# Patient Record
Sex: FEMALE | Race: WHITE | Employment: FULL TIME | URBAN - METROPOLITAN AREA
[De-identification: names, ages, dates, MRNs, and addresses within clinical notes are randomized per-mention and may not be internally consistent; named-entity substitution may affect disease eponyms.]

---

## 2017-11-23 ENCOUNTER — APPOINTMENT (OUTPATIENT)
Dept: GENERAL RADIOLOGY | Age: 57
End: 2017-11-23
Attending: PHYSICIAN ASSISTANT
Payer: COMMERCIAL

## 2017-11-23 ENCOUNTER — HOSPITAL ENCOUNTER (EMERGENCY)
Age: 57
Discharge: HOME | End: 2017-11-23
Attending: EMERGENCY MEDICINE
Payer: COMMERCIAL

## 2017-11-23 VITALS
RESPIRATION RATE: 16 BRPM | DIASTOLIC BLOOD PRESSURE: 95 MMHG | SYSTOLIC BLOOD PRESSURE: 135 MMHG | WEIGHT: 224 LBS | BODY MASS INDEX: 36 KG/M2 | TEMPERATURE: 97.5 F | HEIGHT: 66 IN | OXYGEN SATURATION: 98 % | HEART RATE: 66 BPM

## 2017-11-23 DIAGNOSIS — S92.352A CLOSED DISPLACED FRACTURE OF FIFTH METATARSAL BONE OF LEFT FOOT, INITIAL ENCOUNTER: Primary | ICD-10-CM

## 2017-11-23 PROCEDURE — 99283 EMERGENCY DEPT VISIT LOW MDM: CPT

## 2017-11-23 PROCEDURE — 73630 X-RAY EXAM OF FOOT: CPT

## 2017-11-23 PROCEDURE — 77030036687 HC SHOE PSTOP S2SG -A

## 2017-11-23 RX ORDER — HYDROCODONE BITARTRATE AND ACETAMINOPHEN 5; 325 MG/1; MG/1
TABLET ORAL
Qty: 12 TAB | Refills: 0 | Status: SHIPPED | OUTPATIENT
Start: 2017-11-23

## 2017-11-23 RX ORDER — CITALOPRAM 40 MG/1
40 TABLET, FILM COATED ORAL DAILY
COMMUNITY

## 2017-11-23 RX ORDER — PROPRANOLOL HYDROCHLORIDE 80 MG/1
CAPSULE, EXTENDED RELEASE ORAL DAILY
COMMUNITY

## 2017-11-23 RX ORDER — PRAVASTATIN SODIUM 40 MG/1
40 TABLET ORAL
COMMUNITY

## 2017-11-23 NOTE — ED PROVIDER NOTES
Patient is a 61 y.o. female presenting with fall and foot injury. Fall     Foot Injury      Emeli Pires is a 61 y.o. female presents with L foot pain \"after I missed step last night and hit my foot\" Denies of any hx of injury to L foot or pain in L ankle, knee and hip. Denies of any R ankle pain, knee or hip pain. Noticed some swelilng on the L foot. Pain currently is a dull aching pain   Past Medical History:   Diagnosis Date    Fibromyalgia     High cholesterol     Psychiatric disorder     biplolar/anxiety       Past Surgical History:   Procedure Laterality Date    HX HEENT      t&a    HX HEENT      eye    HX ORTHOPAEDIC      bilateral bunion and hammer toe         History reviewed. No pertinent family history. Social History     Social History    Marital status: SINGLE     Spouse name: N/A    Number of children: N/A    Years of education: N/A     Occupational History    Not on file. Social History Main Topics    Smoking status: Never Smoker    Smokeless tobacco: Never Used    Alcohol use 8.4 oz/week     14 Glasses of wine per week    Drug use: Not on file    Sexual activity: Not on file     Other Topics Concern    Not on file     Social History Narrative    No narrative on file         ALLERGIES: Latex; Codeine; Nsaids (non-steroidal anti-inflammatory drug); and Sulfa (sulfonamide antibiotics)    Review of Systems   Constitutional: Negative. HENT: Negative. Eyes: Negative. Respiratory: Negative. Cardiovascular: Negative. Gastrointestinal: Negative. Endocrine: Negative. Genitourinary: Negative. Musculoskeletal: Negative. Skin: Negative. Allergic/Immunologic: Negative. Neurological: Negative. Hematological: Negative. Psychiatric/Behavioral: Negative.         Vitals:    11/23/17 1011   BP: (!) 135/95   Pulse: 66   Resp: 16   Temp: 97.5 °F (36.4 °C)   SpO2: 98%   Weight: 101.6 kg (224 lb)   Height: 5' 6\" (1.676 m)            Physical Exam Constitutional: She is oriented to person, place, and time. She appears well-developed and well-nourished. No distress. HENT:   Head: Normocephalic and atraumatic. Eyes: Conjunctivae and EOM are normal. Pupils are equal, round, and reactive to light. Neck: Normal range of motion. Cardiovascular: Normal rate, regular rhythm and normal heart sounds. Pulmonary/Chest: Effort normal and breath sounds normal. No respiratory distress. She has no wheezes. She has no rales. She exhibits no tenderness. Abdominal: Soft. Bowel sounds are normal. She exhibits no distension. There is no tenderness. There is no rebound and no guarding. Musculoskeletal:        Feet:    Neurological: She is alert and oriented to person, place, and time. Skin: Skin is warm. She is not diaphoretic. Psychiatric: She has a normal mood and affect. Her behavior is normal. Judgment and thought content normal.        MDM  Number of Diagnoses or Management Options  Closed displaced fracture of fifth metatarsal bone of left foot, initial encounter:   Diagnosis management comments: Updated patient on the diagnosis and advised nurse to provide copy for X ray on CD before discharge . Provided post op shoe and crutches and analgesic. Will discharge. ED Course       Procedures      DISCHARGE NOTE:  11:03 AM    Joan Cantrell  results have been reviewed with her. She has been counseled regarding her diagnosis, treatment, and plan. She verbally conveys understanding and agreement of the signs, symptoms, diagnosis, treatment and prognosis and additionally agrees to follow up as discussed. She also agrees with the care-plan and conveys that all of her questions have been answered. I have also provided discharge instructions for her that include: educational information regarding their diagnosis and treatment, and list of reasons why they would want to return to the ED prior to their follow-up appointment, should her condition change. CLINICAL IMPRESSION:    1. Closed displaced fracture of fifth metatarsal bone of left foot, initial encounter        AFTER VISIT PLAN:    Current Discharge Medication List      START taking these medications    Details   HYDROcodone-acetaminophen (NORCO) 5-325 mg per tablet Take one tablet every 6-8 hours as needed for pain. Qty: 12 Tab, Refills: 0              Follow-up Information     Follow up With Details Comments Contact Info    primary care provider    please follow up with your primary care provider    [facility]  If symptoms worsen [address] [facility]  [phone]    primary care provider       orthopedic    please follow up with your orthopedic provider in [place] as part of ER follow up .            Written by Paul Mcconnell PA-C

## 2017-11-23 NOTE — ED NOTES
I have reviewed discharge instructions with the patient. The patient verbalized understanding. Current Discharge Medication List      START taking these medications    Details   HYDROcodone-acetaminophen (NORCO) 5-325 mg per tablet Take one tablet every 6-8 hours as needed for pain.   Qty: 12 Tab, Refills: 0         Patient armband removed and shredded

## 2017-11-23 NOTE — ED TRIAGE NOTES
Pt reports falling down steps last night, reports left foot pain. Pt reports painful ambulation. Pt with abrasions to left elbow. Pt presents in NAD.

## 2017-11-23 NOTE — CALL BACK NOTE
After initial encounter noted radiology official read read as   \"Acute fractures of the left fourth and fifth metatarsals with  probable nondisplaced fracture of the mid left third metatarsal. No definite  joint space involvement. Moderate associated soft tissue swelling. Called and left message on the answering service with the new finding. Discussed case with Dr Susu Castro, who agrees with no change in treatment other than post op shoe and follow up with orthopedics.

## 2017-11-23 NOTE — DISCHARGE INSTRUCTIONS
Fifth Metatarsal Fracture: Rehab Exercises  Your Care Instructions  Here are some examples of typical rehabilitation exercises for your condition. Start each exercise slowly. Ease off the exercise if you start to have pain. Your doctor or physical therapist will tell you when you can start these exercises and which ones will work best for you. How to do the exercises  Calf wall stretch (back knee straight)    1. Stand facing a wall with your hands on the wall at about eye level. Put your affected foot about a step behind your other foot. 2. Keeping your back leg straight and your back heel on the floor, bend your front knee and gently bring your hip and chest toward the wall until you feel a stretch in the calf of your back leg. 3. Hold the stretch for at least 15 to 30 seconds. 4. Repeat 2 to 4 times. Calf wall stretch (knees bent)    1. Stand facing a wall with your hands on the wall at about eye level. Put your affected foot about a step behind your other foot. 2. Keeping both heels on the floor, bend both knees. Then gently bring your hip and chest toward the wall until you feel a stretch in the calf of your back leg. 3. Hold the stretch for at least 15 to 30 seconds. 4. Repeat 2 to 4 times. Mexican Hat pick-ups    1. Put some marbles on the floor next to a cup.  2. Sit in a chair, and use the toes of your affected foot to lift up one marble from the floor at a time. Then try to put the marble in the cup.  3. Repeat 8 to 12 times. Towel scrunches    1. Sit in a chair, and place both feet on a towel on the floor. 2. Scrunch the towel toward you with your toes. Then use your toes to push the towel back into place. 3. Repeat 8 to 12 times. Towel inversion and eversion    1. Sit in a chair, and place both feet on a towel on the floor. 2. Swivel your feet from side to side to slide the towel. First slide your toes, then your heels, as you move the towel with your feet.  Then change directions and swivel your feet from side to side to slide the towel back to the starting position. 3. Repeat 8 to 12 times. Resisted ankle inversion    1. Sit on the floor with your good foot crossed over your affected foot. 2. Hold both ends of an exercise band, and loop the band around the inside of your affected foot. Then press your other foot against the band. 3. Keeping your feet crossed, slowly push your affected foot against the band so that foot moves away from your other foot. Then slowly relax. 4. Repeat 8 to 12 times. Resisted ankle eversion    1. Sit on the floor with your legs straight. 2. Hold both ends of an exercise band, and loop the band around the outside of your affected foot. Then press your other foot against the band. 3. Keeping your leg straight, slowly push your affected foot outward against the band and away from your other foot without letting your leg rotate. Then slowly relax. 4. Repeat 8 to 12 times. Follow-up care is a key part of your treatment and safety. Be sure to make and go to all appointments, and call your doctor if you are having problems. It's also a good idea to know your test results and keep a list of the medicines you take. Where can you learn more? Go to http://aldair-daniela.info/. Enter [id number] [id number] in the search box to learn more about \"Fifth Metatarsal Fracture: Rehab Exercises. \"  Current as of: March 21, 2017  Content Version: 11.4  © 4566-6326 Health[employer], [employer]. Care instructions adapted under license by [employer] (which disclaims liability or warranty for this information). If you have questions about a medical condition or this instruction, always ask your healthcare professional. [address] any warranty or liability for your use of this information. [employer] Activation    Thank you for requesting access to [employer]. Please follow the instructions below to securely access and download your online medical record. [employer] allows you to send messages to your doctor, view your test results, renew your prescriptions, schedule appointments, and more. How Do I Sign Up? 1. In your internet browser, go to [email or web address]  2. Click on the First Time User? Click Here link in the Sign In box. You will be redirect to the New Member Sign Up page. 3. Enter your [employer] Access Code exactly as it appears below. You will not need to use this code after youve completed the sign-up process. If you do not sign up before the expiration date, you must request a new code. [employer] Access Code: [email or web address]  Expires: 2018 10:59 AM (This is the date your [employer] access code will )    4. Enter the last four digits of your Social Security Number (xxxx) and Date of Birth (mm/dd/yyyy) as indicated and click Submit. You will be taken to the next sign-up page. 5. Create a [employer] ID. This will be your [employer] login ID and cannot be changed, so think of one that is secure and easy to remember. 6. Create a [employer] password. You can change your password at any time. 7. Enter your Password Reset Question and Answer. This can be used at a later time if you forget your password. 8. Enter your e-mail address. You will receive e-mail notification when new information is available in [address] 9. Click Sign Up. You can now view and download portions of your medical record. 10. Click the Download Summary menu link to download a portable copy of your medical information. Additional Information    If you have questions, please visit the Frequently Asked Questions section of the [employer] website at [email or web address] Remember, [employer] is NOT to be used for urgent needs. For medical emergencies, dial 911.